# Patient Record
Sex: MALE | Race: WHITE
[De-identification: names, ages, dates, MRNs, and addresses within clinical notes are randomized per-mention and may not be internally consistent; named-entity substitution may affect disease eponyms.]

---

## 2017-09-25 ENCOUNTER — HOSPITAL ENCOUNTER (EMERGENCY)
Dept: HOSPITAL 62 - ER | Age: 27
Discharge: HOME | End: 2017-09-25
Payer: SELF-PAY

## 2017-09-25 VITALS — SYSTOLIC BLOOD PRESSURE: 137 MMHG | DIASTOLIC BLOOD PRESSURE: 83 MMHG

## 2017-09-25 DIAGNOSIS — L02.11: Primary | ICD-10-CM

## 2017-09-25 DIAGNOSIS — Z86.14: ICD-10-CM

## 2017-09-25 PROCEDURE — 10060 I&D ABSCESS SIMPLE/SINGLE: CPT

## 2017-09-25 PROCEDURE — 96372 THER/PROPH/DIAG INJ SC/IM: CPT

## 2017-09-25 PROCEDURE — A6266 IMPREG GAUZE NO H20/SAL/YARD: HCPCS

## 2017-09-25 PROCEDURE — S0119 ONDANSETRON 4 MG: HCPCS

## 2017-09-25 PROCEDURE — 99283 EMERGENCY DEPT VISIT LOW MDM: CPT

## 2017-09-25 PROCEDURE — 0H94XZZ DRAINAGE OF NECK SKIN, EXTERNAL APPROACH: ICD-10-PCS | Performed by: GENERAL ACUTE CARE HOSPITAL

## 2017-09-25 NOTE — ER DOCUMENT REPORT
ED Skin Rash/Insect Bite/Abscs





- General


Chief Complaint: Abscess


Stated Complaint: POSSIBLE ABSCESS


Time Seen by Provider: 09/25/17 02:45


Notes: 


Patient is a 27-year-old male comes emergency department for chief complaint of 

an abscess on the back of his neck at the base of his hairline, he states he 

has squeezed pus out of it but it has spread instead of going away.  He states 

he thinks he had a fever yesterday.  He denies any daily medications, he does 

not have diabetes.





TRAVEL OUTSIDE OF THE [place] IN LAST 30 DAYS: No





- Related Data


Allergies/Adverse Reactions: 


 





No Known Allergies Allergy (Unverified 09/25/17 02:07)


 











Past Medical History





- General


Information source: Patient





- Social History


Smoking Status: Current Every Day Smoker


Chew tobacco use (# tins/day): No


Frequency of alcohol use: None


Drug Abuse: None


Lives with: Family


Family History: None, Reviewed & Not Pertinent


Patient has suicidal ideation: No


Patient has homicidal ideation: No


Renal/ Medical History: Denies: Hx Peritoneal Dialysis


Skin Medical History: Reports Hx Cellulitis


Psychiatric Medical History: Reports: Hx Depression





- Immunizations


Immunizations up to date: Yes


Hx Diphtheria, Pertussis, Tetanus Vaccination: No





Review of Systems





- Review of Systems


Constitutional: No symptoms reported


EENT: No symptoms reported


Cardiovascular: No symptoms reported


Respiratory: No symptoms reported


Gastrointestinal: No symptoms reported


Genitourinary: No symptoms reported


Male Genitourinary: No symptoms reported


Musculoskeletal: No symptoms reported


Skin: See HPI


Hematologic/Lymphatic: No symptoms reported


Neurological/Psychological: No symptoms reported





Physical Exam





- Vital signs


Vitals: 


 











Temp Pulse Resp BP Pulse Ox


 


 97.8 F   94   16   137/83 H  97 


 


 09/25/17 02:02  09/25/17 02:02  09/25/17 02:02  09/25/17 02:02  09/25/17 02:02











Interpretation: Normal





- General


General appearance: Appears well, Alert


In distress: None





- HEENT


Head: Normocephalic, Atraumatic


Eyes: Normal


Pupils: PERRL





- Respiratory


Respiratory status: No respiratory distress


Chest status: Nontender


Breath sounds: Normal


Chest palpation: Normal





- Cardiovascular


Rhythm: Regular


Heart sounds: Normal auscultation


Murmur: No





- Abdominal


Inspection: Normal


Distension: No distension


Bowel sounds: Normal


Tenderness: Nontender


Organomegaly: No organomegaly





- Back


Back: Normal, Nontender





- Extremities


General upper extremity: Normal inspection, Nontender, Normal color, Normal ROM

, Normal temperature


General lower extremity: Normal inspection, Nontender, Normal color, Normal ROM

, Normal temperature, Normal weight bearing.  No: Jm's sign





- Neurological


Neuro grossly intact: Yes


Cognition: Normal


Orientation: AAOx4


[place] Coma Scale Eye Opening: Spontaneous


[place] Coma Scale Verbal: Oriented


[place] Coma Scale Motor: Obeys Commands


[place] Coma Scale Total: 15


Speech: Normal


Motor strength normal: LUE, RUE, LLE, RLE


Sensory: Normal





- Psychological


Associated symptoms: Normal affect, Normal mood





- Skin


Skin Temperature: Warm


Skin Moisture: Dry


Skin Color: Normal


Skin irregularity: Abscess - 2 adjacent abscesses at the nape of the left neck 

area, induration and fluctuance, medial one is already draining, no significant 

erythema spreading away from the area, normal skin exam otherwise





Course





- Re-evaluation


Re-evalutation: 


2 abscesses drained and packed.  Provided dressing materials, discussed care of 

the abscesses, discussed follow-up and return precautions.  Because of patient'

s history of MRSA he was also placed on Bactrim.  Patient is afebrile here, 

vital signs unremarkable.  Patient states understanding and agreement.





- Vital Signs


Vital signs: 


 











Temp Pulse Resp BP Pulse Ox


 


 97.8 F   94   16   137/83 H  97 


 


 09/25/17 02:02  09/25/17 02:02  09/25/17 02:02  09/25/17 02:02  09/25/17 02:02














Procedures





- Incision and Drainage


  ** Left mid posterior neck


Type: Multiple - 2 separate abscesses


Anesthetic type: 1% Lidocaine


mL's of anesthetic: 4


Blade size: 11


I&D procedure: Shurclens applied - Surgical cleanser, Iodoform packing placed, 

Sterile dressing applied


Incision Method: Incision made by scalpel


Amount/type of drainage: Moderate amount of purulent drainage





Discharge





- Discharge


Clinical Impression: 


 Abscess





Condition: Stable


Disposition: HOME, SELF-CARE


Additional Instructions: 


Your examination showed abscess of sebaceous (oil) glands.  


In 48 hours take the packing out, clean with soap and water, apply absorbent 

dressing over the area while these drain and heal. 


Take the antibiotic as prescribed. 


Follow up with Primary Care. Return to the ED for any concerning or worsening 

symptoms - spreading redness, swelling, spiking fever, etc.





Prescriptions: 


Sulfamethoxazole/Trimethoprim [Bactrim Ds Tablet] 1 each PO BID #14 tablet


Forms:  Return to Work

## 2017-11-08 ENCOUNTER — HOSPITAL ENCOUNTER (OUTPATIENT)
Dept: HOSPITAL 62 - ER | Age: 27
Setting detail: OBSERVATION
Discharge: HOME | End: 2017-11-08
Attending: INTERNAL MEDICINE | Admitting: INTERNAL MEDICINE
Payer: SELF-PAY

## 2017-11-08 VITALS — SYSTOLIC BLOOD PRESSURE: 127 MMHG | DIASTOLIC BLOOD PRESSURE: 83 MMHG

## 2017-11-08 DIAGNOSIS — F17.210: ICD-10-CM

## 2017-11-08 DIAGNOSIS — Z86.19: ICD-10-CM

## 2017-11-08 DIAGNOSIS — Z98.890: ICD-10-CM

## 2017-11-08 DIAGNOSIS — Z53.21: ICD-10-CM

## 2017-11-08 DIAGNOSIS — F41.9: ICD-10-CM

## 2017-11-08 DIAGNOSIS — R59.9: ICD-10-CM

## 2017-11-08 DIAGNOSIS — F19.11: ICD-10-CM

## 2017-11-08 DIAGNOSIS — B02.8: Primary | ICD-10-CM

## 2017-11-08 DIAGNOSIS — F31.9: ICD-10-CM

## 2017-11-08 LAB
ALBUMIN SERPL-MCNC: 4.5 G/DL (ref 3.5–5)
ALP SERPL-CCNC: 39 U/L (ref 38–126)
ALT SERPL-CCNC: 51 U/L (ref 21–72)
ANION GAP SERPL CALC-SCNC: 14 MMOL/L (ref 5–19)
AST SERPL-CCNC: 34 U/L (ref 17–59)
BASOPHILS # BLD AUTO: 0 10^3/UL (ref 0–0.2)
BASOPHILS NFR BLD AUTO: 0.3 % (ref 0–2)
BILIRUB DIRECT SERPL-MCNC: 0.4 MG/DL (ref 0–0.4)
BILIRUB SERPL-MCNC: 1.7 MG/DL (ref 0.2–1.3)
BUN SERPL-MCNC: 9 MG/DL (ref 7–20)
CALCIUM: 9.5 MG/DL (ref 8.4–10.2)
CHLORIDE SERPL-SCNC: 102 MMOL/L (ref 98–107)
CO2 SERPL-SCNC: 23 MMOL/L (ref 22–30)
CREAT SERPL-MCNC: 0.81 MG/DL (ref 0.52–1.25)
EOSINOPHIL # BLD AUTO: 0.1 10^3/UL (ref 0–0.6)
EOSINOPHIL NFR BLD AUTO: 0.9 % (ref 0–6)
ERYTHROCYTE [DISTWIDTH] IN BLOOD BY AUTOMATED COUNT: 13.2 % (ref 11.5–14)
GLUCOSE SERPL-MCNC: 120 MG/DL (ref 75–110)
HCT VFR BLD CALC: 44.3 % (ref 37.9–51)
HGB BLD-MCNC: 15.7 G/DL (ref 13.5–17)
HGB HCT DIFFERENCE: 2.8
LYMPHOCYTES # BLD AUTO: 2.3 10^3/UL (ref 0.5–4.7)
LYMPHOCYTES NFR BLD AUTO: 21.5 % (ref 13–45)
MCH RBC QN AUTO: 29.8 PG (ref 27–33.4)
MCHC RBC AUTO-ENTMCNC: 35.5 G/DL (ref 32–36)
MCV RBC AUTO: 84 FL (ref 80–97)
MONOCYTES # BLD AUTO: 0.9 10^3/UL (ref 0.1–1.4)
MONOCYTES NFR BLD AUTO: 8.6 % (ref 3–13)
NEUTROPHILS # BLD AUTO: 7.4 10^3/UL (ref 1.7–8.2)
NEUTS SEG NFR BLD AUTO: 68.7 % (ref 42–78)
POTASSIUM SERPL-SCNC: 3.8 MMOL/L (ref 3.6–5)
PROT SERPL-MCNC: 7.5 G/DL (ref 6.3–8.2)
RBC # BLD AUTO: 5.28 10^6/UL (ref 4.35–5.55)
SODIUM SERPL-SCNC: 138.9 MMOL/L (ref 137–145)
WBC # BLD AUTO: 10.7 10^3/UL (ref 4–10.5)

## 2017-11-08 PROCEDURE — 96368 THER/DIAG CONCURRENT INF: CPT

## 2017-11-08 PROCEDURE — 96367 TX/PROPH/DG ADDL SEQ IV INF: CPT

## 2017-11-08 PROCEDURE — 96365 THER/PROPH/DIAG IV INF INIT: CPT

## 2017-11-08 PROCEDURE — 83605 ASSAY OF LACTIC ACID: CPT

## 2017-11-08 PROCEDURE — 80053 COMPREHEN METABOLIC PANEL: CPT

## 2017-11-08 PROCEDURE — 87040 BLOOD CULTURE FOR BACTERIA: CPT

## 2017-11-08 PROCEDURE — 85025 COMPLETE CBC W/AUTO DIFF WBC: CPT

## 2017-11-08 PROCEDURE — 70491 CT SOFT TISSUE NECK W/DYE: CPT

## 2017-11-08 PROCEDURE — 96375 TX/PRO/DX INJ NEW DRUG ADDON: CPT

## 2017-11-08 PROCEDURE — 71020: CPT

## 2017-11-08 PROCEDURE — 36415 COLL VENOUS BLD VENIPUNCTURE: CPT

## 2017-11-08 PROCEDURE — 99285 EMERGENCY DEPT VISIT HI MDM: CPT

## 2017-11-08 PROCEDURE — 96366 THER/PROPH/DIAG IV INF ADDON: CPT

## 2017-11-08 RX ADMIN — SODIUM CHLORIDE PRN ML: 9 INJECTION, SOLUTION INTRAVENOUS at 16:58

## 2017-11-08 RX ADMIN — SODIUM CHLORIDE PRN ML: 9 INJECTION, SOLUTION INTRAVENOUS at 15:42

## 2017-11-08 NOTE — PDOC H&P
History of Present Illness


Patient complains of: Neck pain for 1 week


History of Present Illness: 


TYE KENYON is a 27 year old male presented to urgent care complaining of 

neck pain for 1 week.  Patient states that has been having break outs for the 

past month.  He has been treated with a combination of Septra and Keflex with 

no improvement.  Patient thinks describes excruciating pain burning like.  He 

has been exposed to shingles.  Patient admits a history of opioid abuse in the 

past.  Patient complains of having bad nerves.  He also complains of having 

high tolerance to medications for pain.  He smokes a pack of cigarettes daily.  

Has a history of hepatitis C.  During the course of evaluation in the emergency 

room patient was treated with Zosyn, clindamycin and vancomycin.  Hospitalist 

service was contacted for further management and prompted to admit primarily 

for observation status for pain management.








Past Medical History


Cardiac Medical History: Reports: None


Pulmonary Medical History: Reports: None


EENT Medical History: Reports: None


Neurological Medical History: Reports: None


Endocrine Medical History: Reports: None


Renal/ Medical History: Reports: None


Malignancy Medical History: Reports: None


GI Medical History: Reports: None, Hepatitis - Hepatitis C, Other


Psychiatric Medical History: Reports: Depression, Substance Abuse, Tobacco 

Dependency


Traumatic Medical History: Reports: None


Infectious Medical History: Reports: None





Past Surgical History


Past Surgical History: Reports: Other - Incision and drainage of lesions of the 

back of the neck





Social History


Lives with: Family


Smoking Status: Current Every Day Smoker


Frequency of Alcohol Use: None


Hx Recreational Drug Use: No


Hx Prescription Drug Abuse: Yes





- Advance Directive


Resuscitation Status: Full Code





Family History


Family History: Reviewed & Not Pertinent


Parental Family History Reviewed: Yes


Children Family History Reviewed: Yes


Sibling(s) Family History Reviewed.: Yes





Medication/Allergy


Home Medications: 








No Home Medications  11/08/17 








Allergies/Adverse Reactions: 


 





No Known Allergies Allergy (Verified 11/08/17 14:29)


 











Review of Systems


Constitutional: ABSENT: chills, fever(s), headache(s)


Eyes: ABSENT: visual disturbances


Ears: ABSENT: hearing changes


Cardiovascular: ABSENT: chest pain, edema, palpitations


Respiratory: ABSENT: dyspnea, hemoptysis


Gastrointestinal: ABSENT: diarrhea, dysphagia, heartburn


Genitourinary: ABSENT: dysuria, hematuria


Musculoskeletal: ABSENT: deformity, joint swelling


Integumentary: PRESENT: rash


Neurological: ABSENT: abnormal gait, focal weakness


Psychiatric: PRESENT: anxiety, depression.  ABSENT: homidical ideation, 

suicidal ideation





Physical Exam


Vital Signs: 


 











Temp Pulse Resp BP Pulse Ox


 


 98.9 F   133 H  13   147/86 H  98 


 


 11/08/17 14:28  11/08/17 14:28  11/08/17 16:04  11/08/17 14:28  11/08/17 16:04








 Intake & Output











 11/07/17 11/08/17 11/09/17





 06:59 06:59 06:59


 


Weight   96.6 kg











General appearance: PRESENT: mild distress, well-developed, well-nourished


Head exam: PRESENT: atraumatic, normocephalic


Eye exam: PRESENT: conjunctiva pink, EOMI, PERRLA


Ear exam: PRESENT: normal external ear exam


Mouth exam: PRESENT: moist, other - There is mild redness and swelling 

localized to the posterior aspect of neck.  There are some dried vesicular 

lesions noted left sided posteriorly.


Neck exam: PRESENT: tenderness.  ABSENT: full ROM, JVD, lymphadenopathy, 

meningismus


Respiratory exam: PRESENT: clear to auscultation miryam


Cardiovascular exam: PRESENT: diastolic murmur, RRR, systolic murmur


Vascular exam: PRESENT: normal capillary refill


GI/Abdominal exam: PRESENT: normal bowel sounds, soft.  ABSENT: tenderness


Extremities exam: PRESENT: full ROM.  ABSENT: joint swelling, pedal edema


Musculoskeletal exam: PRESENT: full ROM


Neurological exam: PRESENT: alert, oriented to person, oriented to place, 

oriented to time


Psychiatric exam: PRESENT: anxious, appropriate affect





Results


Laboratory Results: 


 





 11/08/17 15:35 





 11/08/17 15:35 





 











  11/08/17 11/08/17 11/08/17





  15:35 15:35 15:35


 


WBC  10.7 H  


 


RBC  5.28  


 


Hgb  15.7  


 


Hct  44.3  


 


MCV  84  


 


MCH  29.8  


 


MCHC  35.5  


 


RDW  13.2  


 


Plt Count  163  


 


Seg Neutrophils %  68.7  


 


Lymphocytes %  21.5  


 


Monocytes %  8.6  


 


Eosinophils %  0.9  


 


Basophils %  0.3  


 


Absolute Neutrophils  7.4  


 


Absolute Lymphocytes  2.3  


 


Absolute Monocytes  0.9  


 


Absolute Eosinophils  0.1  


 


Absolute Basophils  0.0  


 


Sodium   138.9 


 


Potassium   3.8 


 


Chloride   102 


 


Carbon Dioxide   23 


 


Anion Gap   14 


 


BUN   9 


 


Creatinine   0.81 


 


Est GFR ( Amer)   > 60 


 


Est GFR (Non-Af Amer)   > 60 


 


Glucose   120 H 


 


Lactic Acid    1.1


 


Calcium   9.5 


 


Total Bilirubin   1.7 H 


 


AST   34 


 


ALT   51 


 


Alkaline Phosphatase   39 


 


Total Protein   7.5 


 


Albumin   4.5 











Impressions: 


 





Soft Tissue Neck CT  11/08/17 15:38


IMPRESSION:  Slight adenopathy and  skin thickening posterior left neck, nodes 

up to 15 mm.  No soft tissue air or abnormal fluid collections.


 








Chest X-Ray  11/08/17 15:40


IMPRESSION:  NO SIGNIFICANT RADIOGRAPHIC FINDING IN THE CHEST.


 














Assessment & Plan





- Diagnosis


(1) Shingles


Qualifiers: 


   Herpes zoster complications: unspecified herpes zoster complication   

Qualified Code(s): B02.8 - Zoster with other complications   


Is this a current diagnosis for this admission?: Yes   


Plan: 


Patient will be placed on valacyclovir a 1,000 milligrams twice a day, 

gabapentin and Toradol IV.  Patient made aware that will avoid opioid use due 

to his strong history of opioid abuse.  Will also add muscle relaxer.








(2) Depressed bipolar disorder


Is this a current diagnosis for this admission?: Yes   


Plan: 


Patient will be placed on Prozac and Zyprexa








(3) Tobacco abuse


Is this a current diagnosis for this admission?: Yes   


Plan: 


Will place on nicotine patch.  Will proceed with counseling in a.m. since at 

the present time may not be receptive for further counseling due to pain








- Time


Time Spent: 30 to 50 Minutes


Medications reviewed and adjusted accordingly: Yes


Anticipated discharge: Home


Within: within 24 hours





- Inpatient Certification


Based on my medical assessment, after consideration of the patient's 

comorbidities, presenting symptoms, or acuity I expect that the services needed 

warrant INPATIENT care.: No


I certify that my determination is in accordance with my understanding of 

Medicare's requirements for reasonable and necessary INPATIENT services [42 CFR 

412.3e].: Yes

## 2017-11-08 NOTE — RADIOLOGY REPORT (SQ)
EXAM DESCRIPTION:  CHEST PA/LAT



COMPLETED DATE/TIME:  11/8/2017 4:20 pm



REASON FOR STUDY:  fever neck infection



COMPARISON:  8/20/2015



EXAM PARAMETERS:  NUMBER OF VIEWS: two views

TECHNIQUE: Digital Frontal and Lateral radiographic views of the chest acquired.

RADIATION DOSE: NA

LIMITATIONS: none



FINDINGS:  LUNGS AND PLEURA: No opacities, masses or pneumothorax. No pleural effusion.

MEDIASTINUM AND HILAR STRUCTURES: No masses or contour abnormalities.

HEART AND VASCULAR STRUCTURES: Heart normal size.  No evidence for failure.

BONES: No acute findings.

HARDWARE: None in the chest.

OTHER: No other significant finding.



IMPRESSION:  NO SIGNIFICANT RADIOGRAPHIC FINDING IN THE CHEST.



TECHNICAL DOCUMENTATION:  JOB ID:  [id number]

 2011 [employer]- All Rights Reserved

## 2017-11-08 NOTE — RADIOLOGY REPORT (SQ)
EXAM DESCRIPTION:  CT SOFT TISSUE NECK WITH



COMPLETED DATE/TIME:  11/8/2017 4:26 pm



REASON FOR STUDY:  neck infection fever tachycardic

 Attention posterior left neck



COMPARISON:  None.



TECHNIQUE:  Post IV contrasted scanning from skull base through lung apices with review of bone, soft
 tissue and lung windows.  Reconstructed coronal and sagittal MPR images reviewed.  All images stored
 on PACS.

All CT scanners at this facility use dose modulation, iterative reconstruction, and/or weight based d
osing when appropriate to reduce radiation dose to as low as reasonably achievable (ALARA).

[facility]: Dose Right  [facility]: CareDose    [facility]: Dose Right    [facility]: Teradose 4D    [facility]: Smart Technologies



CONTRAST TYPE AND DOSE:  contrast/concentration: Isovue 370.00 mg/ml; Total Contrast Delivered: 75.0 
ml; Total Saline Delivered: 55.0 ml

75 mL Isovue 370 intravenously



RENAL FUNCTION:  Not required for younger patients.



RADIATION DOSE:  Up-to-date CT equipment and radiation dose reduction techniques were employed. CTDIv
ol: 16.1 mGy. DLP: 625 mGy-cm. .



LIMITATIONS:  None.



FINDINGS:  SKULL BASE: Intact.

MAJOR SALIVARY GLANDS: No solid or cystic masses.  No inflammatory changes.

LYMPHADENOPATHY: Scattered nodes are seen in the posterior left neck up to 15 mm.  No soft tissue air
 or abnormal fluid collections.  Skin thickening is noted.

MUCOSAL MASSES OR ASYMMETRY: No mucosal masses or asymmetry.

LARYNX/CORDS: No abnormal findings.

VASCULAR STRUCTURES: The major vessels are patent.

LUNG APICES: Clear.

BONES: Intact.

THYROID: Normal size.  No masses.

PARANASAL SINUSES: Clear.

OTHER: No other significant finding.



IMPRESSION:  Slight adenopathy and  skin thickening posterior left neck, nodes up to 15 mm.  No soft 
tissue air or abnormal fluid collections.



TECHNICAL DOCUMENTATION:  JOB ID:  [id number]

Quality ID # 436: Final reports with documentation of one or more dose reduction techniques (e.g., Au
tomated exposure control, adjustment of the mA and/or kV according to patient size, use of iterative 
reconstruction technique)

 2011 [employer]- All Rights Reserved

## 2017-11-09 NOTE — PROGRESS NOTE
Provider Note


Provider Note: 





Patient left AMA in less than 24 hours. H&P should serve as discharge summary 

in the vent is needed.

## 2018-02-27 ENCOUNTER — HOSPITAL ENCOUNTER (EMERGENCY)
Dept: HOSPITAL 62 - ER | Age: 28
Discharge: HOME | End: 2018-02-27
Payer: SELF-PAY

## 2018-02-27 VITALS — DIASTOLIC BLOOD PRESSURE: 69 MMHG | SYSTOLIC BLOOD PRESSURE: 118 MMHG

## 2018-02-27 DIAGNOSIS — F17.200: ICD-10-CM

## 2018-02-27 DIAGNOSIS — R68.83: ICD-10-CM

## 2018-02-27 DIAGNOSIS — L03.313: Primary | ICD-10-CM

## 2018-02-27 LAB
ADD MANUAL DIFF: NO
ANION GAP SERPL CALC-SCNC: 8 MMOL/L (ref 5–19)
BASOPHILS # BLD AUTO: 0.1 10^3/UL (ref 0–0.2)
BASOPHILS NFR BLD AUTO: 0.7 % (ref 0–2)
BUN SERPL-MCNC: 10 MG/DL (ref 7–20)
CALCIUM: 8.5 MG/DL (ref 8.4–10.2)
CHLORIDE SERPL-SCNC: 101 MMOL/L (ref 98–107)
CO2 SERPL-SCNC: 27 MMOL/L (ref 22–30)
EOSINOPHIL # BLD AUTO: 0.7 10^3/UL (ref 0–0.6)
EOSINOPHIL NFR BLD AUTO: 7.3 % (ref 0–6)
ERYTHROCYTE [DISTWIDTH] IN BLOOD BY AUTOMATED COUNT: 13 % (ref 11.5–14)
GLUCOSE SERPL-MCNC: 118 MG/DL (ref 75–110)
HCT VFR BLD CALC: 42.4 % (ref 37.9–51)
HGB BLD-MCNC: 15 G/DL (ref 13.5–17)
LYMPHOCYTES # BLD AUTO: 2.4 10^3/UL (ref 0.5–4.7)
LYMPHOCYTES NFR BLD AUTO: 24.3 % (ref 13–45)
MCH RBC QN AUTO: 30.7 PG (ref 27–33.4)
MCHC RBC AUTO-ENTMCNC: 35.3 G/DL (ref 32–36)
MCV RBC AUTO: 87 FL (ref 80–97)
MONOCYTES # BLD AUTO: 0.4 10^3/UL (ref 0.1–1.4)
MONOCYTES NFR BLD AUTO: 3.9 % (ref 3–13)
NEUTROPHILS # BLD AUTO: 6.4 10^3/UL (ref 1.7–8.2)
NEUTS SEG NFR BLD AUTO: 63.8 % (ref 42–78)
PLATELET # BLD: 109 10^3/UL (ref 150–450)
POTASSIUM SERPL-SCNC: 3.3 MMOL/L (ref 3.6–5)
RBC # BLD AUTO: 4.89 10^6/UL (ref 4.35–5.55)
SODIUM SERPL-SCNC: 136.1 MMOL/L (ref 137–145)
TOTAL CELLS COUNTED % (AUTO): 100 %
WBC # BLD AUTO: 10 10^3/UL (ref 4–10.5)

## 2018-02-27 PROCEDURE — 96367 TX/PROPH/DG ADDL SEQ IV INF: CPT

## 2018-02-27 PROCEDURE — 85025 COMPLETE CBC W/AUTO DIFF WBC: CPT

## 2018-02-27 PROCEDURE — 96375 TX/PRO/DX INJ NEW DRUG ADDON: CPT

## 2018-02-27 PROCEDURE — 96365 THER/PROPH/DIAG IV INF INIT: CPT

## 2018-02-27 PROCEDURE — 36415 COLL VENOUS BLD VENIPUNCTURE: CPT

## 2018-02-27 PROCEDURE — 80048 BASIC METABOLIC PNL TOTAL CA: CPT

## 2018-02-27 PROCEDURE — 99283 EMERGENCY DEPT VISIT LOW MDM: CPT

## 2018-02-27 NOTE — ER DOCUMENT REPORT
ED Skin Rash/Insect Bite/Abscs





- General


Chief Complaint: Cellulitis/ abscess L chest


Stated Complaint: INFECTION IN CHEST


Time Seen by Provider: 02/27/18 00:33


Notes: 


Patient is a 27-year-old male that comes emergency department for chief 

complaint of a tender red area over his left chest, he states that 2 days ago 

he started to have a tender red area that had a head, he states yesterday he 

took a scalpel and stabbed into it and cut and then "milked out a lot of pus", 

he states that he has had developing and spreading redness since that time.  He 

started having shaking chills this evening before coming to the emergency 

department.  Patient reports his tetanus is up-to-date within 5 years, he 

denies ever injecting IV drugs, he denies any current medications.  Past 

medical history of cellulitis, he states it was after an accident he got a 

wound infection that he was hospitalized for.





TRAVEL OUTSIDE OF THE [place] IN LAST 30 DAYS: No





- Related Data


Allergies/Adverse Reactions: 


 





No Known Allergies Allergy (Verified 11/08/17 14:29)


 











Past Medical History





- General


Information source: Patient





- Social History


Smoking Status: Current Some Day Smoker


Drug Abuse: None


Lives with: Spouse/Significant other


Family History: Reviewed & Not Pertinent


Renal/ Medical History: Denies: Hx Peritoneal Dialysis


GI Medical History: Reports: Hx Hepatitis - Hepatitis C


Musculoskeltal Medical History: Reports Hx Musculoskeletal Deformity, Reports 

Hx Musculoskeletal Trauma


Skin Medical History: Reports Hx Cellulitis


Psychiatric Medical History: Reports: Hx Depression


Infectious Medical History: Reports: Hx Hepatitis - Hepatitis C


Past Surgical History: Reports: Other - Incision and drainage of lesions of the 

back of the neck





- Immunizations


Immunizations up to date: Yes


Hx Diphtheria, Pertussis, Tetanus Vaccination: No





Review of Systems





- Review of Systems


Constitutional: See HPI


EENT: No symptoms reported


Cardiovascular: No symptoms reported


Respiratory: No symptoms reported


Gastrointestinal: No symptoms reported


Genitourinary: No symptoms reported


Male Genitourinary: No symptoms reported


Musculoskeletal: No symptoms reported


Skin: See HPI


Hematologic/Lymphatic: No symptoms reported


Neurological/Psychological: No symptoms reported





Physical Exam





- Vital signs


Vitals: 


 











Temp Pulse Resp BP Pulse Ox


 


 98.6 F   124 H  20   124/96 H  100 


 


 02/27/18 00:26  02/27/18 00:26  02/27/18 00:26  02/27/18 00:26  02/27/18 00:26














- General


General appearance: Anxious


In distress: Mild - Patient shivering, appears uncomfortable





- HEENT


Head: Normocephalic, Atraumatic


Eyes: Normal


Conjunctiva: Normal


Extraocular movements intact: Yes


Eyelashes: Normal


Pupils: PERRL


Nasal: Normal


Mouth/Lips: Normal


Mucous membranes: Normal


Pharynx: Normal


Neck: Normal





- Respiratory


Respiratory status: No respiratory distress


Chest status: Tender - Left upper chest near the point of the shoulder at the 

AC joint (about 2 cm medially) there is a hole with small amount of purulent 

drainage.  There is spreading erythema away from this area slightly up towards 

the clavicle and significantly down over the pectoralis and towards the lower 

chest on the left side.  No induration, no nearby adenopathy, no other 

abnormality noted


Breath sounds: Normal.  No: Decreased air movement, Wheezing





- Cardiovascular


Rhythm: Regular, Tachycardia


Heart sounds: Normal auscultation, S1 appreciated, S2 appreciated





- Abdominal


Inspection: Normal


Tenderness: Nontender.  No: Tender, Guarding





- Back


Back: Normal, Nontender.  No: Vertebra tenderness





- Extremities


General upper extremity: Normal inspection, Nontender, Normal ROM, Normal 

strength


General lower extremity: Normal inspection, Nontender, Normal ROM, Normal 

strength.  No: Edema





- Neurological


Neuro grossly intact: Yes


Cognition: Normal


Orientation: AAOx4


[place] Coma Scale Eye Opening: Spontaneous


[place] Coma Scale Verbal: Oriented


[place] Coma Scale Motor: Obeys Commands


[place] Coma Scale Total: 15


Speech: Normal


Motor strength normal: LUE, RUE, LLE, RLE


Sensory: Normal





Course





- Re-evaluation


Re-evalutation: 


I placed a Q-tip in the draining of incision, able to feel the bottom without 

any difficulty, no deep abscess, my ultrasound of the area at bedside and no 

fluid collection is observed at this time.  Appears to be only cellulitis 

although cellulitis is pretty extensive spreading up towards the neck and down 

towards the chest over the pectoralis muscle.  Patient tachycardic and having 

intermittent chills.  No leukocytosis, no diabetes history, no fever.  BMP 

shows mild hypo-kalemia, but no evidence of undiagnosed diabetes.  Treating 

with vancomycin and Rocephin.  Provided with pain medication.  Given dose of 

potassium.





Discussed with Dr. Shanks, recommends admission for IV antibiotics.  Discussed 

with Dr. Ya, patient will be admitted to the medical floor.  Patient states 

understanding and agreement with this plan.





02/27/18 


Hospitalist came to me and stated the patient change his mind and wanted to 

leave.  I went and spoke with patient, patient states that he did indeed change 

his mind and now he wants to go home.  He states that he wants to try 

antibiotics at home and return if he worsens.  I discussed that this is already 

significant cellulitis, he has had problems with cellulitis in the past, this 

could potentially be life-threatening if it develops into a secondary infection 

or sepsis, he states that he understands this but he wants to try it at home 

first.  Patient will be double covered because he believes he has a history of 

MRSA, area was traced, patient advised to have close reevaluation, patient is 

to return immediately if he worsens in any way, patient and significant other 

state understanding and agreement.








- Vital Signs


Vital signs: 


 











Temp Pulse Resp BP Pulse Ox


 


 98.3 F   124 H  14   118/69   95 


 


 02/27/18 04:01  02/27/18 00:26  02/27/18 04:01  02/27/18 04:01  02/27/18 04:01














- Laboratory


Result Diagrams: 


 02/27/18 01:10





 02/27/18 01:45


Laboratory results interpreted by me: 


 











  02/27/18 02/27/18





  01:10 01:45


 


Plt Count  109 L 


 


Eosinophils %  7.3 H 


 


Absolute Eosinophils  0.7 H 


 


Sodium   136.1 L


 


Potassium   3.3 L


 


Glucose   118 H














Discharge





- Discharge


Clinical Impression: 


 Cellulitis of chest wall





Condition: Stable


Disposition: HOME, SELF-CARE


Additional Instructions: 


At this time you have declined admission to the hospital.


Take Bactrim and clindamycin antibiotics for cellulitis as directed, keep a 

clean dressing over the area of previous incision, clean area gently with soap 

and water.


I recommend a recheck in 24-48 hours.  Return immediately if you worsen in any 

way including increased spreading of redness, fever of 100.4 or greater, or any 

other concerning symptoms.


Prescriptions: 


Clindamycin HCl 300 mg PO ASDIR PRN #28 capsule


 PRN Reason: 


Sulfamethoxazole/Trimethoprim [Bactrim Ds Tablet] 1 each PO BID #14 tablet


Referrals: 


RONALDO MAYER MD [Primary Care Provider] - Follow up tomorrow

## 2018-04-05 ENCOUNTER — HOSPITAL ENCOUNTER (EMERGENCY)
Dept: HOSPITAL 62 - ER | Age: 28
Discharge: HOME | End: 2018-04-05
Payer: SELF-PAY

## 2018-04-05 VITALS — SYSTOLIC BLOOD PRESSURE: 125 MMHG | DIASTOLIC BLOOD PRESSURE: 73 MMHG

## 2018-04-05 DIAGNOSIS — F17.200: ICD-10-CM

## 2018-04-05 DIAGNOSIS — R11.10: ICD-10-CM

## 2018-04-05 DIAGNOSIS — R51: ICD-10-CM

## 2018-04-05 DIAGNOSIS — R09.81: Primary | ICD-10-CM

## 2018-04-05 DIAGNOSIS — H53.149: ICD-10-CM

## 2018-04-05 PROCEDURE — 99283 EMERGENCY DEPT VISIT LOW MDM: CPT

## 2018-04-05 PROCEDURE — S0119 ONDANSETRON 4 MG: HCPCS

## 2018-04-05 NOTE — ER DOCUMENT REPORT
HPI





- HPI


Pain Level: 5


Context: 


Patient is a 27-year-old male presents emergency department the chief complaint 

of headache.  Admits to sinus congestion over the past 10 days.  Has not been 

taking anything for it.  States that today he woke up with a headache took 

Tylenol twice a dental significant improvement.  As a one episode of emesis.  

It also admits to light sensitivity.  Denies any previous history of migraines.

  Denies any vision changes, focal neurological weakness.  Describes his 

headache as frontal along the maxillary and frontal sinuses.  Denies any fevers 

or chills, purulent drainage from the sinuses





- REPRODUCTIVE


Reproductive: DENIES: Pregnant:





Past Medical History





- Social History


Smoking Status: Current Every Day Smoker


Family History: Reviewed & Not Pertinent


Renal/ Medical History: Denies: Hx Peritoneal Dialysis


GI Medical History: Reports: Hx Hepatitis - Hepatitis C


Musculoskeltal Medical History: Reports Hx Musculoskeletal Deformity, Reports 

Hx Musculoskeletal Trauma


Skin Medical History: Reports Hx Cellulitis


Psychiatric Medical History: Reports: Hx Depression


Infectious Medical History: Reports: Hx Hepatitis - Hepatitis C


Past Surgical History: Reports: Other - Incision and drainage of lesions of the 

back of the neck





- Immunizations


Immunizations up to date: Yes


Hx Diphtheria, Pertussis, Tetanus Vaccination: No





Vertical Provider Document





- CONSTITUTIONAL


Agree With Documented VS: Yes


Notes: 





PHYSICAL EXAM


GENERAL: Alert, interacts well. 


HEAD: Normocephalic, atraumatic.


EYES: Pupils equal, round, and reactive to light. Extraocular movements intact.


ENT: Oral mucosa moist, tongue midline.  Admits to sinus pressure during 

palpation but no focal tenderness


NECK: Full range of motion. Supple. Trachea midline.


EXTREMITIES: Moves all 4 extremities spontaneously. No edema, radial and 

dorsalis pedis pulses 2/4 bilaterally. No cyanosis. 


NEUROLOGICAL: Alert and oriented x4.  Face symmetric.  Tongue protrudes 

midline.  Extraocular motions intact.  Pupils are 2 mm and equally reactive.  

Normal speech, normal gait.  5 out of 5 strength in both the distal and 

proximal upper and lower extremities bilaterally.  Sensation is grossly intact 

throughout.  Finger to nose testing normal.  Pronator drift normal.


PSYCH: Normal affect, normal mood.


SKIN: Warm, dry, normal turgor. No rashes or lesions noted.








- INFECTION CONTROL


TRAVEL OUTSIDE OF THE [place] IN LAST 30 DAYS: No





Course





- Re-evaluation


Re-evalutation: 





04/05/18 23:09


Patient is a 27-year-old male is hemodynamically stable, no acute distress and 

afebrile.  Presentation is consistent with a frontal sinus headache due to 

congestion.  No concern for an acute sinusitis given afebrile without any focal 

erythema, purulent drainage.  Patient does not have any focal neurologic 

deficits, nuchal rigidity, vital signs are within normal limits no papilledema.

  Patient is otherwise no acute distress and hemodynamically stable.  Low index 

for suspicion of acute subarachnoid hemorrhage, meningitis or mass.  Low 

suspicion for acute life-threatening etiology with intact neuro exam therefore 

no additional imaging or laboratory testing is indicated.  Will discharge 

patient home with strict follow-up with PCP 








- Vital Signs


Vital signs: 


 











Temp Pulse Resp BP Pulse Ox


 


 98.7 F   86   16   125/73   96 


 


 04/05/18 20:49  04/05/18 20:49  04/05/18 20:49  04/05/18 20:49  04/05/18 20:49














Discharge





- Discharge


Clinical Impression: 


 Sinus congestion





Headache


Qualifiers:


 Headache type: unspecified Headache chronicity pattern: acute headache 

Intractability: intractable Qualified Code(s): R51 - Headache





Condition: Good


Disposition: HOME, SELF-CARE


Additional Instructions: 


For sinus congestion he can take over-the-counter Claritin-D or Allegra-D which 

has the added benefit of decongestant.  For nasal congestion he can take over-

the-counter Afrin which is a nasal spray.  It is important to take this as 

directed on the bottle because if you overuse it you can become dependent on 

it.  Otherwise please take that had not medication as directed.








You have been seen in the Emergency Department (ED) for a headache.  Please use 

Tylenol (acetaminophen) or Motrin (ibuprofen) as needed for symptoms, but only 

as written on the box. 





As we have discussed, please follow up with your primary care doctor as soon as 

possible regarding today's ED visit and your headache symptoms.  





Call your doctor or return to the ED if you have a worsening headache, sudden 

and severe headache, confusion, slurred speech, facial droop, weakness or 

numbness in any arm or leg, extreme fatigue, or other symptoms that concern you.


Prescriptions: 


Butalb/Acetaminophen/Caffeine [Fioricet (-40 mg) Tablet] 1 tab PO Q4HP 

PRN #30 tab


 PRN Reason: 


Referrals: 


RONALDO MAYER MD [Primary Care Provider] - Follow up as needed

## 2020-09-14 ENCOUNTER — HOSPITAL ENCOUNTER (EMERGENCY)
Dept: HOSPITAL 62 - ER | Age: 30
Discharge: HOME | End: 2020-09-14
Payer: SELF-PAY

## 2020-09-14 VITALS — DIASTOLIC BLOOD PRESSURE: 60 MMHG | SYSTOLIC BLOOD PRESSURE: 114 MMHG

## 2020-09-14 DIAGNOSIS — F17.200: ICD-10-CM

## 2020-09-14 DIAGNOSIS — M25.532: Primary | ICD-10-CM

## 2020-09-14 DIAGNOSIS — Z86.19: ICD-10-CM

## 2020-09-14 PROCEDURE — 99283 EMERGENCY DEPT VISIT LOW MDM: CPT

## 2020-09-14 NOTE — RADIOLOGY REPORT (SQ)
EXAM DESCRIPTION:  WRIST LEFT 3 VIEWS



IMAGES COMPLETED DATE/TIME:  9/14/2020 4:00 pm



REASON FOR STUDY:  [place]ell off [place] 5 days ago



COMPARISON:  None.



NUMBER OF VIEWS:  Three views.



TECHNIQUE:  AP, lateral, and oblique radiographic images acquired of the left wrist.



LIMITATIONS:  None.



FINDINGS:  MINERALIZATION: Normal.

BONES: No acute fracture or dislocation.  No worrisome bone lesions.  Normal alignment.

SOFT TISSUES: No soft tissue swelling.  No foreign body.

OTHER: No other significant finding.



IMPRESSION:  NEGATIVE STUDY OF THE LEFT WRIST. NO RADIOGRAPHIC EVIDENCE OF ACUTE INJURY.



TECHNICAL DOCUMENTATION:  JOB ID:  [id number]

 2011 [employer]- All Rights Reserved



Reading location - IP/workstation name: [id number]
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
